# Patient Record
Sex: MALE | Race: WHITE
[De-identification: names, ages, dates, MRNs, and addresses within clinical notes are randomized per-mention and may not be internally consistent; named-entity substitution may affect disease eponyms.]

---

## 2018-11-24 ENCOUNTER — HOSPITAL ENCOUNTER (EMERGENCY)
Dept: HOSPITAL 97 - ER | Age: 73
Discharge: ANOTHER HOSPITAL | End: 2018-11-24
Payer: COMMERCIAL

## 2018-11-24 DIAGNOSIS — Z86.73: ICD-10-CM

## 2018-11-24 DIAGNOSIS — R09.2: Primary | ICD-10-CM

## 2018-11-24 DIAGNOSIS — Z79.82: ICD-10-CM

## 2018-11-24 DIAGNOSIS — I50.9: ICD-10-CM

## 2018-11-24 DIAGNOSIS — E03.9: ICD-10-CM

## 2018-11-24 DIAGNOSIS — I95.9: ICD-10-CM

## 2018-11-24 DIAGNOSIS — F03.90: ICD-10-CM

## 2018-11-24 DIAGNOSIS — G40.909: ICD-10-CM

## 2018-11-24 DIAGNOSIS — R00.1: ICD-10-CM

## 2018-11-24 DIAGNOSIS — I10: ICD-10-CM

## 2018-11-24 LAB
ALBUMIN SERPL BCP-MCNC: 1.9 G/DL (ref 3.4–5)
ALP SERPL-CCNC: 135 U/L (ref 45–117)
ALT SERPL W P-5'-P-CCNC: 392 U/L (ref 12–78)
AST SERPL W P-5'-P-CCNC: 728 U/L (ref 15–37)
BUN BLD-MCNC: 19 MG/DL (ref 7–18)
CKMB CREATINE KINASE MB: 1.2 NG/ML (ref 0.3–3.6)
COHGB MFR BLDA: 1.1 % (ref 0–1.5)
COHGB MFR BLDA: 1.4 % (ref 0–1.5)
GLUCOSE SERPLBLD-MCNC: 103 MG/DL (ref 74–106)
HCT VFR BLD CALC: 35.2 % (ref 39.6–49)
INR BLD: 1.79
LIPASE SERPL-CCNC: 40 U/L (ref 73–393)
LYMPHOCYTES # SPEC AUTO: 4 K/UL (ref 0.7–4.9)
MCH RBC QN AUTO: 28.1 PG (ref 27–35)
MCV RBC: 91.4 FL (ref 80–100)
MORPHOLOGY BLD-IMP: (no result)
OXYHGB MFR BLDA: 85.5 % (ref 94–97)
OXYHGB MFR BLDA: 96.7 % (ref 94–97)
PMV BLD: 9.8 FL (ref 7.6–11.3)
POTASSIUM SERPL-SCNC: 4.6 MMOL/L (ref 3.5–5.1)
RBC # BLD: 3.86 M/UL (ref 4.33–5.43)
SAO2 % BLDA: 87.4 % (ref 92–98.5)
SAO2 % BLDA: 98.7 % (ref 92–98.5)
TROPONIN (EMERG DEPT USE ONLY): 0.03 NG/ML (ref 0–0.04)
UA COMPLETE W REFLEX CULTURE PNL UR: (no result)
VALPROATE SERPL-MCNC: 12 UG/ML (ref 50–100)

## 2018-11-24 PROCEDURE — 81015 MICROSCOPIC EXAM OF URINE: CPT

## 2018-11-24 PROCEDURE — 82962 GLUCOSE BLOOD TEST: CPT

## 2018-11-24 PROCEDURE — 85610 PROTHROMBIN TIME: CPT

## 2018-11-24 PROCEDURE — 87205 SMEAR GRAM STAIN: CPT

## 2018-11-24 PROCEDURE — 80048 BASIC METABOLIC PNL TOTAL CA: CPT

## 2018-11-24 PROCEDURE — 80164 ASSAY DIPROPYLACETIC ACD TOT: CPT

## 2018-11-24 PROCEDURE — 87086 URINE CULTURE/COLONY COUNT: CPT

## 2018-11-24 PROCEDURE — 80076 HEPATIC FUNCTION PANEL: CPT

## 2018-11-24 PROCEDURE — 82553 CREATINE MB FRACTION: CPT

## 2018-11-24 PROCEDURE — 87186 SC STD MICRODIL/AGAR DIL: CPT

## 2018-11-24 PROCEDURE — 85025 COMPLETE CBC W/AUTO DIFF WBC: CPT

## 2018-11-24 PROCEDURE — 94002 VENT MGMT INPAT INIT DAY: CPT

## 2018-11-24 PROCEDURE — 93005 ELECTROCARDIOGRAM TRACING: CPT

## 2018-11-24 PROCEDURE — 82550 ASSAY OF CK (CPK): CPT

## 2018-11-24 PROCEDURE — 83690 ASSAY OF LIPASE: CPT

## 2018-11-24 PROCEDURE — 82805 BLOOD GASES W/O2 SATURATION: CPT

## 2018-11-24 PROCEDURE — 87040 BLOOD CULTURE FOR BACTERIA: CPT

## 2018-11-24 PROCEDURE — 84145 PROCALCITONIN (PCT): CPT

## 2018-11-24 PROCEDURE — 99291 CRITICAL CARE FIRST HOUR: CPT

## 2018-11-24 PROCEDURE — 36415 COLL VENOUS BLD VENIPUNCTURE: CPT

## 2018-11-24 PROCEDURE — 70450 CT HEAD/BRAIN W/O DYE: CPT

## 2018-11-24 PROCEDURE — 85730 THROMBOPLASTIN TIME PARTIAL: CPT

## 2018-11-24 PROCEDURE — 74177 CT ABD & PELVIS W/CONTRAST: CPT

## 2018-11-24 PROCEDURE — 99292 CRITICAL CARE ADDL 30 MIN: CPT

## 2018-11-24 PROCEDURE — 92950 HEART/LUNG RESUSCITATION CPR: CPT

## 2018-11-24 PROCEDURE — 87088 URINE BACTERIA CULTURE: CPT

## 2018-11-24 PROCEDURE — 71045 X-RAY EXAM CHEST 1 VIEW: CPT

## 2018-11-24 PROCEDURE — 87077 CULTURE AEROBIC IDENTIFY: CPT

## 2018-11-24 PROCEDURE — 84484 ASSAY OF TROPONIN QUANT: CPT

## 2018-11-24 PROCEDURE — 81003 URINALYSIS AUTO W/O SCOPE: CPT

## 2018-11-24 PROCEDURE — 83605 ASSAY OF LACTIC ACID: CPT

## 2018-11-24 NOTE — ER
Nurse's Notes                                                                                     

 [facility]                                                                

Name: Kenn Schulte                                                                                 

Age: 73 yrs                                                                                       

Sex: Male                                                                                         

: 1945                                                                                   

MRN: [id number]                                                                                   

Arrival Date: 2018                                                                          

Time: 09:37                                                                                       

Account#: [id number]                                                                            

Bed 3                                                                                             

Private MD:                                                                                       

Diagnosis: CPR - successful, Hypotension, bradycardia, respiratory arrest                         

                                                                                                  

Presentation:                                                                                     

                                                                                             

09:28 Presenting complaint: EMS states: toned out for pt "crashing", CPR in progress at 0857  iw  

      from [facility], arrived to scene, pt was asystole on monitor, CPR in progress,      

      ACLS initiated by EMS, 4 rounds of CPR, 3 rounds of EPI and one bicarb given, ROSC          

      achieved approx 8 min PTA. Care prior to arrival: Assisted ventilation, Oral                

      intubation, CPR via thumper performed by bystander performed by EMS Medication(s)           

      given: Epi X 3, Bicarb X 1. Compressions began prior to arrival.                            

09:28 Method Of Arrival: EMS: [facility]                                                       iw  

09:28 Acuity: CECILE 1                                                                           iw  

09:28 Transition of care: patient was received from another setting of care (long-term care     

      facility), [place]. Onset of symptoms was 2018. Risk Assessment: Do you      

      want to hurt yourself or someone else? Unable to obtain. Initial Sepsis Screen: Does        

      the patient meet any 2 criteria? No. Patient's initial sepsis screen is negative. Does      

      the patient have a suspected source of infection?.                                          

09:28 Care prior to arrival: IV initiated. 20 GA, in the left thigh Glucose check: 122 Oxygen iw  

      administered. via AMBU bag.                                                                 

09:28 Care prior to arrival: Medication(s) given: Normal saline infusion, 500 mL.             iw  

                                                                                                  

Historical:                                                                                       

- Allergies:                                                                                      

11:47 No Known Allergies;                                                                     sv  

- Home Meds:                                                                                      

12:17 Melatonin Oral [Active]; Depakote Oral [Active]; Lactulose Oral [Active]; Levaquin Oral sv  

      [Active]; levothyroxine oral [Active]; Aspirin Oral [Active]; Nystatin Oral [Active];       

      Norco Oral [Active]; Colace oral oral [Active]; Ativan Oral [Active];                       

- PMHx:                                                                                           

11:47 Seizures; CVA; TIA; CAD; Hypertension; CHF; DVT; advanced dementia; aspiration pnemonia;sv  

12:17 Hypothyroidism;                                                                         sv  

12:32 right and left leg DVT; dyslipidemia;                                                   sv  

- PSHx:                                                                                           

11:47 IVC filter;                                                                             sv  

                                                                                                  

- Immunization history:: Adult Immunizations unknown.                                             

- Social history:: Smoking status: unknown.                                                       

- Ebola Screening: : Patient negative for fever greater than or equal to 101.5 degrees            

  Fahrenheit, and additional compatible Ebola Virus Disease symptoms Patient denies               

  exposure to infectious person Patient denies travel to an Ebola-affected area in the            

  21 days before illness onset No symptoms or risks identified at this time.                      

- Code Status:: Full code.                                                                        

                                                                                                  

                                                                                                  

Screenin:57 Abuse screen: Denies threats or abuse. Nutritional screening: No deficits noted.        tw2 

      Tuberculosis screening: No symptoms or risk factors identified. Fall Risk Secondary         

      diagnosis (15 points) impaired mobility.                                                    

                                                                                                  

Assessment:                                                                                       

09:28 General: Appears ill, unkempt, emaciated, Behavior is unresponsive. Pain: Unable to use iw  

      pain scale. Patient is intubated. Patient is unresponsive. Neuro: Level of                  

      Consciousness is unresponsive. Cardiovascular: Rhythm is sinus bradycardia.                 

      Respiratory: Airway via oral intubation Trachea midline Respiratory pattern is regular.     

      GI: Abdomen is flat, Enteral feeding tube clamped. : Sanchez in place to gravity            

      drainage Urine is cloudy, Penile discharge is white. Derm: Skin is fragile, is thin,        

      with poor turgor.                                                                           

09:32 Reassessment: strong femoral pulse palpated to right groin, HR=44, BP=47/31, ETT        iw  

      suctioned byRT.                                                                             

09:35 Reassessment: ETT moved from 26 cm at gum to 24 cm at gum, per Dr. Gibson, maintain    iw  

      7.5 ETT by EMS.                                                                             

09:45 Reassessment: Dopamine started at 20 mcg/kg/min.                                        sv  

09:47 Reassessment: Dr. Gibson at bedside to set up for central line placement, RT bagging   iw  

      pt, ZT=822, 100% SpO2.                                                                      

10:00 Reassessment: verbal order to change dopamine infusion to Evans-Synephrine infusion for   iw  

      HR of 130.                                                                                  

10:30 General: Appears ill, emaciated, Behavior is unresponsive. intubated. Pain: Unable to   sv  

      use pain scale. Patient is intubated. Patient is unresponsive. Cardiovascular:              

      Patient's skin is warm and dry. Pulses are 2+ in right radial artery and left radial        

      artery Rhythm is sinus rhythm. Respiratory: Airway via oral intubation Trachea midline      

      Respiratory effort is even, unlabored, Respiratory pattern is regular, symmetrical,         

      Breath sounds with rhonchi bilaterally. : Sanchez in place to gravity drainage. Derm:       

      Skin is fragile, is thin, with poor turgor.                                                 

11:30 Reassessment: Patient appears in no apparent distress at this time. No changes from     sv  

      previously documented assessment. Pt remains intubated.                                     

12:23 Reassessment: Patient appears in no apparent distress at this time. No changes from     sv  

      previously documented assessment. Pt remains intubated.                                     

12:38 Reassessment: CRITICAL LAB ALERT: LACTATE 4.7, provider notified and notified           stan Alicia RN.                                                                               

13:17 Reassessment: Dr Gibson speaking with the granddaughter.                               sv  

13:17 Reassessment: Patient appears in no apparent distress at this time. No changes from     sv  

      previously documented assessment. Pt remains intubated.                                     

13:45 Reassessment: While in CT, pt received the IV contrast and pt was asystole x2 but pulse sv  

      came back on its own with no CPR. Came back to ER#3 and informed Dr Gibson.                

13:58 Reassessment: Pt back in ER #3. Pt has had 2 more episodes of going asystole and then   sv  

      his pulse came back on its own with Dr Gibson at bedside.                                  

14:57 Reassessment: Patient appears in no apparent distress at this time. No changes from     sv  

      previously documented assessment. Pt remains intubated.                                     

                                                                                                  

Vital Signs:                                                                                      

09:36  / 75; Pulse 86; Resp 29 A; Pulse Ox 100% on ETT ambu;                            iw  

09:45 Weight 63.5 kg (R);                                                                     tw2 

09:45 BP 68 / 45; Pulse 126; Resp 16; Temp 100.7(R); Pulse Ox 100% on ETT ambu;               iw  

09:49 BP 74 / 44; Pulse 122; Resp 18 A; Pulse Ox 100% on ETT ambu;                            iw  

10:08 BP 81 / 64; Pulse 110; Resp 20 A; Pulse Ox 100% on ETT ambu;                            iw  

10:13 BP 80 / 52; Pulse 103; Resp 14 A; Pulse Ox 100% on ETT ambu;                            iw  

10:35 BP 56 / 41; Pulse 95; Resp 23; Pulse Ox 92% on 60% FiO2 ETT vent;                       sv  

10:36 BP 59 / 41; Pulse 93; Resp 22; Pulse Ox 91% on 60% FiO2 ETT vent;                       sv  

10:40 BP 65 / 39; Pulse 92; Resp 22; Pulse Ox 93% on 60% FiO2 ETT vent;                       sv  

10:43 BP 62 / 43; Pulse 90; Resp 24; Pulse Ox 93% on 60% FiO2 ETT vent;                       sv  

10:48 BP 66 / 45; Pulse 88; Resp 22; Pulse Ox 95% on 60% FiO2 ETT vent;                       sv  

10:50 BP 69 / 48; Pulse 87; Resp 25; Pulse Ox 96% on 60% FiO2 ETT vent;                       sv  

10:55 BP 77 / 48; Pulse 84; Resp 16; Pulse Ox 98% on 60% FiO2 ETT vent;                       sv  

10:59 BP 88 / 50; Pulse 82; Resp 11; Pulse Ox 98% on 60% FiO2 ETT vent;                       sv  

11:00 BP 91 / 55; Pulse 79; Resp 16; Pulse Ox 98% on 60% FiO2 ETT vent;                       sv  

11:05 BP 93 / 59; Pulse 82; Resp 17; Pulse Ox 97% on 60% FiO2 ETT vent;                       sv  

11:10  / 60; Pulse 80; Resp 18; Pulse Ox 98% on 60% FiO2 ETT vent;                      sv  

11:15 BP 90 / 51; Pulse 83; Resp 11; Temp 97.5(R); Pulse Ox 97% on 60% FiO2 ETT vent;         sv  

11:27  / 56; Pulse 82; Resp 16; Pulse Ox 96% on 60% FiO2 ETT vent;                      sv  

11:45  / 62; Pulse 82; Resp 16; Pulse Ox 97% on 60% FiO2 ETT vent;                      sv  

11:55  / 66; Pulse 82; Resp 19; Pulse Ox 99% on 60% FiO2 ETT vent;                      sv  

12:20  / 60; Pulse 79; Resp 19; Pulse Ox 98% on 60% FiO2 ETT vent;                      sv  

12:50  / 59; Pulse 69; Resp 21; Pulse Ox 98% on 60% FiO2 ETT vent;                      sv  

13:00 BP 80 / 57; Pulse 90; Resp 28; Pulse Ox 99% on 60% FiO2 ETT vent;                       sv  

13:10 BP 97 / 52; Pulse 74; Resp 21; Pulse Ox 99% on 60% FiO2 ETT vent;                       sv  

13:48 BP 95 / 47; Pulse 78; Resp 16; Pulse Ox 100% on 60% FiO2 ETT vent;                      sv  

14:05  / 71; Pulse 64; Resp 16; Temp 96.9(R); Pulse Ox 100% on 60% FiO2 ETT vent;       sv  

14:15  / 74; Pulse 92; Resp 12; Pulse Ox 100% on 60% FiO2 ETT vent;                     sv  

14:30  / 70; Pulse 97; Resp 25; Pulse Ox 100% on 60% FiO2 ETT vent;                     sv  

14:45  / 76; Pulse 99; Resp 16; Pulse Ox 100% on 60% FiO2 ETT vent;                     sv  

15:00  / 73; Pulse 88 MON; Resp 18; Pulse Ox 100% on 60% FiO2 ETT vent;                 sv  

15:15  / 83; Pulse 88; Resp 24; Pulse Ox 100% on 60% FiO2 ETT vent;                     sv  

15:35  / 74; Pulse 95; Resp 16; Pulse Ox 100% on 60% FiO2 ETT vent;                     sv  

15:00 Sinus Rhythm                                                                            sv  

                                                                                                  

ED Course:                                                                                        

09:28 Maintain EMS IV. Dressing intact. Good blood return noted. Site clean \T\ dry. Gauge \T\    sv

      site: 20 left thigh.                                                                        

09:29 Placed in gown. Bed in low position. Side rails up X2. Cardiac monitor on. Pulse ox on. tw2 

      NIBP on.                                                                                    

09:37 Patient arrived in ED.                                                                  iw  

09:39 Jakub Gibson MD is Attending Physician.                                              eb  

09:43 Triage completed.                                                                       iw  

09:43 Maxine Mittal, RN is Primary Nurse.                                                        tw2 

09:44 Arm band placed on.                                                                     iw  

09:50 Assisted provider with central line placement. Set up central line tray. Triple lumen   iw  

      line placed in right femoral. Line placed by Jakub Gibson MD Placement verified by         

      blood return, Dressed with Tape, Tegaderm, Blood was collected. Patient tolerated well.     

      Before procedure, did Practitioner(s) obtain informed consent? No. Time-out/Briefing        

      performed prior to start of procedure? Yes. Was handwashing/sanitizing done immediately     

      prior to procedure? Yes. Was procedure site sterilized? Yes, with chlorhexidine. Was        

      the site allowed to dry? Yes. During the procedure, did the Practitioner(s) maintain a      

      sterile field? Yes. Were unused ports clamped during insertion? Yes. Was a 2nd              

      qualified MD obtained after 3 unsuccessful insertion attempts? No. Was blood aspirated      

      from each lumen? Yes. After the procedure, did the Practitioner(s) clean the site and       

      apply a sterile dressing? Yes.                                                              

10:16 Primary Nurse role handed off by Maxine Mittal RN                                         eb  

10:16 Maral Guzman, RN is Primary Nurse.                                                  eb  

11:17 Chest Single View XRAY In Process Unspecified.                                          EDMS

11:57 LAB Add On Sent.                                                                        sv  

11:59 Basic Metabolic Panel Sent.                                                             sv  

12:26 One-on-one care X 120 minutes.                                                          sv  

12:27 transfer approval from receiving facility.                                              sv  

13:03 Radiology exam delayed due to WAITING ON RESPIRATORY FOR TRANSPORT TO CT.               vm2 

13:16 Patient moved to CT via stretcher.                                                      vm2 

13:42 CT completed. Patient moved back from CT.                                               cw1 

13:48 CT Head Brain wo Cont In Process Unspecified.                                           EDMS

13:48 CT Abd/Pelvis - W/Contrast In Process Unspecified.                                      EDMS

14:08 \T\1254 initiated a transfer with [facility] at the [facility]\T\1313 administrative approval  eb
  

      given by Trinidad Martino accepted the patient in transfer/ patient going to [address] [place] [address] bed 15, report to be called [phone].                                           

14:57 Patient transferred, IV remains in place. intact.                                       sv  

                                                                                                  

Administered Medications:                                                                         

      Discontinued: Evans-Synephrine 100 mcg/min IV at calculated rate continuous; (Standard        

      dilution is 50 mg in 250 mL D5W, final concentration 200 mcg/mL)                            

      Discontinued: Dopamine drip 5 mcg/kg/min - (DOPamine 400 mg, D5W 250 ml) IV at              

      calculated rate continuous; Titrate to keep systolic blood pressure greater than 90mmHg     

09:34 Drug: EPINEPHrine 0.1mg/mL 1:10,000 1 mg {Note: left thigh.} Route: IVP; Site: Other;   iw  

10:00 Follow up: Response: No adverse reaction                                                sv  

09:35 Drug: NS 0.9% (30 ml/kg) 30 ml/kg {Note: left upper thigh.} Route: IV; Rate: bolus;     sv  

      Site: Other;                                                                                

11:00 Follow up: Response: No adverse reaction; IV Status: Completed infusion; IV Intake:     sv  

      2000ml                                                                                      

09:35 Drug: Sodium Bicarbonate 1 amp {Note: left thigh.} Route: IVP; Site: Other;             iw  

10:00 Follow up: Response: No adverse reaction                                                sv  

09:50 Drug: Dopamine drip 5 mcg/kg/min - (DOPamine 400 mg, D5W 250 ml) {Note: staretd at 20   iw  

      mcg/kg/min.} Route: IV; Rate: calculated rate; Site: right antecubital;                     

10:02 Drug: Evans-Synephrine 100 mcg/min {Note: started at 5 mcg/min, per Dr. Gibson.} Route:  iw  

      IV; Rate: calculated rate; Site: right femoral;                                             

10:19 Follow up: Rate change 10 calculated rate                                               sv  

10:26 Follow up: Rate change 40 calculated rate                                               sv  

10:35 Drug: Norepinephrine (4 mg/250 mL D5W) 4 mcg/min {Note: started at 5 mcg/min.} Route:   sv  

      IV; Rate: calculated rate; Site: right femoral;                                             

10:38 Follow up: Rate change 6 mcg/min                                                        sv  

10:41 Follow up: Rate change 8 mcg/min                                                        sv  

10:43 Follow up: Rate change 9 mcg/min                                                        sv  

10:46 Follow up: Rate change 11 mcg/min                                                       sv  

10:49 Follow up: Rate change 20 mcg/min                                                       sv  

10:54 Follow up: Rate change 25 mcg/min                                                       sv  

13:58 Follow up: Rate change 30 mcg/min                                                       sv  

15:34 Follow up: Rate change 20 calculated rate                                               sv  

16:10 Follow up: Response: No adverse reaction; IV Status: Infusion continued upon transfer   sv  

11:00 Drug: Rocephin - (cefTRIAXone) 1 grams Route: IVPB; Infused Over: 30 mins; Site: right  sv  

      femoral;                                                                                    

11:02 Follow up: Response: No adverse reaction; IV Status: Completed infusion; IV Intake: 10mlsv  

11:02 Drug: vancoMYCIN 1 grams Route: IVPB; Infused Over: 2 hrs; Site: right femoral;         sv  

13:00 Follow up: Response: No adverse reaction; IV Status: Completed infusion; IV Intake:     sv  

      250ml                                                                                       

11:41 Drug: Ativan 1 mg Route: IVP; Site: right femoral;                                      sv  

11:56 Follow up: Response: No adverse reaction                                                sv  

13:25 Drug: Depakote 20 mg/kg Route: IVPB; Site: right femoral;                               sv  

14:21 Follow up: Response: No adverse reaction; IV Status: Completed infusion                 tw2 

14:25 Follow up: Response: No adverse reaction; IV Status: Completed infusion; IV Intake:     sv  

      100ml                                                                                       

13:36 Drug: Ativan 2 mg Route: IVP; Site: right femoral;                                      sv  

14:00 Follow up: Response: No adverse reaction                                                sv  

13:58 Drug: Dopamine drip 5 mcg/kg/min - (DOPamine 400 mg, D5W 250 ml) Route: IV; Rate:       sv  

      calculated rate; Site: right femoral;                                                       

16:10 Follow up: Response: No adverse reaction; IV Status: Infusion continued upon transfer   sv  

                                                                                                  

                                                                                                  

Point of Care Testing:                                                                            

      Blood Glucose:                                                                              

09:30 Blood Glucose: 112 mg/dL;                                                               iw  

      Ranges:                                                                                     

                                                                                                  

Intake:                                                                                           

11:00 IV: 2000ml; Total: 2000ml.                                                              sv  

11:02 IV: 10ml; Total: 2010ml.                                                                sv  

13:00 IV: 250ml; Total: 2260ml.                                                               sv  

14:25 IV: 100ml; Total: 2360ml.                                                               sv  

                                                                                                  

Ventilator:                                                                                       

11:29 Fi02: 60%; Rate: 16min; T.V.: 51ml; Peep: 0cm; Mode: CMV; ET tube: 7.0 mm (Oral);       sv  

                                                                                                  

Outcome:                                                                                          

14:56 Transferred by ground EMS to [facility], Transfer form completed.    sv  

      X-rays sent w/ patient. Note:  Report given to Carlee COOL at [facility].     

14:56 Condition: stable                                                                           

14:56 Instructed on the need for transfer.                                                        

15:00 ER care complete, transfer ordered by MD.                                               kdr 

16:10 Patient left the ED.                                                                    sv  

                                                                                                  

Signatures:                                                                                       

Dispatcher [employer]                           EDMaral Fernando RN RN   sv                                                   

Jakub Gibson MD MD kdr Williams, Irene RN                     RN   Ignacia Huynh                             cwWilmer James RN                         RN   [id number]                                                  

Maxine Mittal RN                          RN   [email or web address]                                                  

Rosmery Rodriguez Victoria vm2 Botello, Elizabeth eb                                                   

                                                                                                  

Corrections: (The following items were deleted from the chart)                                    

09:48 09:47 CPR assessment: iw                                                                iw  

10:05 10:00 Maintain EMS IV. Dressing intact. Good blood return noted. Site clean \T\ dry.      iw

      Gauge \T\ site: 20 g Right femoral. la1                                                     

11:27 11:18  / 60; Pulse 89bpm; Resp 14bpm; Pulse Ox 97% ET / Ambu; mh5                 sv  

11:28 11:15 BP 90 / 51; Pulse 83bpm; Resp 11bpm; Pulse Ox 97% FiO2 60% vent; sv               sv  

11:30 10:05 Maintain EMS IV. Dressing intact. Good blood return noted. Site clean \T\ dry.      sv

      Gauge \T\ site: 20 left thigh. iw                                                           

11:55 10:35 Norepinephrine (4 mg/250 mL D5W) 4 mcg/min IV at calculated rate in right femoral sv  

      sv                                                                                          

18:02 14:50 Response: No adverse reaction; IV Status: Infusion continued upon transfer sv     sv  

                                                                                                  

**************************************************************************************************

## 2018-11-24 NOTE — RAD REPORT
EXAM DESCRIPTION:  RAD - Chest Single View - 11/24/2018 11:17 am

 

CLINICAL HISTORY:  Intubation, CPR in progress

 

COMPARISON:  None.

 

TECHNIQUE:  AP portable chest image was obtained 1 1 0 3 hours .

 

FINDINGS:  Endotracheal tube is in place with the tip 1 centimeter above the dariana. Resuscitation pa
ddles are in place. No pneumothorax or tracheal shift. Interstitial and alveolar opacities are presen
t in the right lung field. Small right pleural effusion is suspected. Heart and vasculature are oscar
l. Prominent upper abdominal bowel gas pattern likely from resuscitation efforts. No acute aortic fin
dings suspected.

 

IMPRESSION:  Endotracheal tube in good position.

 

Scattered interstitial and alveolar opacities in the right lung field with probable small right pleur
al effusion.

## 2018-11-24 NOTE — EDPHYS
Physician Documentation                                                                           

 [facility]                                                                

Name: Kenn Schulte                                                                                 

Age: 73 yrs                                                                                       

Sex: Male                                                                                         

: 1945                                                                                   

MRN: [id number]                                                                                   

Arrival Date: 2018                                                                          

Time: 09:37                                                                                       

Account#: [id number]                                                                            

Bed 3                                                                                             

Private MD:                                                                                       

ED Physician Jakub Gibson                                                                       

HPI:                                                                                              

                                                                                             

12:28 This 73 yrs old  Male presents to ER via EMS with complaints of CPR - ROSC.    kdr 

12:28 Preceding the arrest, the patient was found down. The arrest occurred at nursing home.  kdr 

      Pre-hospital course: EMS care prior to arrival: initiation of ACLS, peripheral IV,          

      intubation oxygen, backboard, Time elapsed prior to ACLS is unknown. ACLS has been in       

      progress for 30 minutes. It is unknown whether or not the patient has had similar           

      symptoms in the past. It is unknown whether or not the patient has recently seen a          

      physician. EMS reports that the patient was found down for unknown length of time.          

      Initial rhythm was asystole. Several rounds of epi given with CPR and the patient had       

      SROC. On initial eval in the ED, the patient had a HR in the 80's and hypotensive.          

                                                                                                  

Historical:                                                                                       

- Allergies:                                                                                      

11:47 No Known Allergies;                                                                     sv  

- Home Meds:                                                                                      

12:17 Melatonin Oral [Active]; Depakote Oral [Active]; Lactulose Oral [Active]; Levaquin Oral sv  

      [Active]; levothyroxine oral [Active]; Aspirin Oral [Active]; Nystatin Oral [Active];       

      Norco Oral [Active]; Colace oral oral [Active]; Ativan Oral [Active];                       

- PMHx:                                                                                           

11:47 Seizures; CVA; TIA; CAD; Hypertension; CHF; DVT; advanced dementia; aspiration pnemonia;sv  

12:17 Hypothyroidism;                                                                         sv  

12:32 right and left leg DVT; dyslipidemia;                                                   sv  

- PSHx:                                                                                           

11:47 IVC filter;                                                                             sv  

                                                                                                  

- Immunization history:: Adult Immunizations unknown.                                             

- Social history:: Smoking status: unknown.                                                       

- Ebola Screening: : Patient negative for fever greater than or equal to 101.5 degrees            

  Fahrenheit, and additional compatible Ebola Virus Disease symptoms Patient denies               

  exposure to infectious person Patient denies travel to an Ebola-affected area in the            

  21 days before illness onset No symptoms or risks identified at this time.                      

- Code Status:: Full code.                                                                        

                                                                                                  

                                                                                                  

ROS:                                                                                              

12:54 Constitutional: Unable to determine as the patient is intubated and unresponsive        kdr 

12:54 Unable to obtain ROS due to altered mental status, patient is on ventilator.                

                                                                                                  

Exam:                                                                                             

12:54 Constitutional:  This is a well developed, well nourished but cachetic/emaciated        kdr 

      patient who is awake, alert, and in no acute distress. Head/Face:  Normocephalic,           

      atraumatic. Neck:  Trachea midline, no thyromegaly or masses palpated, and no cervical      

      lymphadenopathy.  Supple, full range of motion without nuchal rigidity, or vertebral        

      point tenderness.  No Meningismus. Cardiovascular:  Regular rate and zac rhythm with      

      a normal S1 and S2.  No gallops, murmurs, or rubs.  Normal PMI, no JVD.  Good pulse         

      after epi but slowly begins to discipate                                                    

12:54 Chest/axilla: Barrel chested.  Initially the BS were decreased on the left - withdrew       

      ET tube 2 cm and breath sounds now improved.                                                

                                                                                                  

Vital Signs:                                                                                      

09:36  / 75; Pulse 86; Resp 29 A; Pulse Ox 100% on ETT ambu;                            iw  

09:45 Weight 63.5 kg (R);                                                                     tw2 

09:45 BP 68 / 45; Pulse 126; Resp 16; Temp 100.7(R); Pulse Ox 100% on ETT ambu;               iw  

09:49 BP 74 / 44; Pulse 122; Resp 18 A; Pulse Ox 100% on ETT ambu;                            iw  

10:08 BP 81 / 64; Pulse 110; Resp 20 A; Pulse Ox 100% on ETT ambu;                            iw  

10:13 BP 80 / 52; Pulse 103; Resp 14 A; Pulse Ox 100% on ETT ambu;                            iw  

10:35 BP 56 / 41; Pulse 95; Resp 23; Pulse Ox 92% on 60% FiO2 ETT vent;                       sv  

10:36 BP 59 / 41; Pulse 93; Resp 22; Pulse Ox 91% on 60% FiO2 ETT vent;                       sv  

10:40 BP 65 / 39; Pulse 92; Resp 22; Pulse Ox 93% on 60% FiO2 ETT vent;                       sv  

10:43 BP 62 / 43; Pulse 90; Resp 24; Pulse Ox 93% on 60% FiO2 ETT vent;                       sv  

10:48 BP 66 / 45; Pulse 88; Resp 22; Pulse Ox 95% on 60% FiO2 ETT vent;                       sv  

10:50 BP 69 / 48; Pulse 87; Resp 25; Pulse Ox 96% on 60% FiO2 ETT vent;                       sv  

10:55 BP 77 / 48; Pulse 84; Resp 16; Pulse Ox 98% on 60% FiO2 ETT vent;                       sv  

10:59 BP 88 / 50; Pulse 82; Resp 11; Pulse Ox 98% on 60% FiO2 ETT vent;                       sv  

11:00 BP 91 / 55; Pulse 79; Resp 16; Pulse Ox 98% on 60% FiO2 ETT vent;                       sv  

11:05 BP 93 / 59; Pulse 82; Resp 17; Pulse Ox 97% on 60% FiO2 ETT vent;                       sv  

11:10  / 60; Pulse 80; Resp 18; Pulse Ox 98% on 60% FiO2 ETT vent;                      sv  

11:15 BP 90 / 51; Pulse 83; Resp 11; Temp 97.5(R); Pulse Ox 97% on 60% FiO2 ETT vent;         sv  

11:27  / 56; Pulse 82; Resp 16; Pulse Ox 96% on 60% FiO2 ETT vent;                      sv  

11:45  / 62; Pulse 82; Resp 16; Pulse Ox 97% on 60% FiO2 ETT vent;                      sv  

11:55  / 66; Pulse 82; Resp 19; Pulse Ox 99% on 60% FiO2 ETT vent;                      sv  

12:20  / 60; Pulse 79; Resp 19; Pulse Ox 98% on 60% FiO2 ETT vent;                      sv  

12:50  / 59; Pulse 69; Resp 21; Pulse Ox 98% on 60% FiO2 ETT vent;                      sv  

13:00 BP 80 / 57; Pulse 90; Resp 28; Pulse Ox 99% on 60% FiO2 ETT vent;                       sv  

13:10 BP 97 / 52; Pulse 74; Resp 21; Pulse Ox 99% on 60% FiO2 ETT vent;                       sv  

13:48 BP 95 / 47; Pulse 78; Resp 16; Pulse Ox 100% on 60% FiO2 ETT vent;                      sv  

14:05  / 71; Pulse 64; Resp 16; Temp 96.9(R); Pulse Ox 100% on 60% FiO2 ETT vent;       sv  

14:15  / 74; Pulse 92; Resp 12; Pulse Ox 100% on 60% FiO2 ETT vent;                     sv  

14:30  / 70; Pulse 97; Resp 25; Pulse Ox 100% on 60% FiO2 ETT vent;                     sv  

14:45  / 76; Pulse 99; Resp 16; Pulse Ox 100% on 60% FiO2 ETT vent;                     sv  

15:00  / 73; Pulse 88 MON; Resp 18; Pulse Ox 100% on 60% FiO2 ETT vent;                 sv  

15:15  / 83; Pulse 88; Resp 24; Pulse Ox 100% on 60% FiO2 ETT vent;                     sv  

15:35  / 74; Pulse 95; Resp 16; Pulse Ox 100% on 60% FiO2 ETT vent;                     sv  

15:00 Sinus Rhythm                                                                            sv  

                                                                                                  

Ventilator:                                                                                       

11:29 Fi02: 60%; Rate: 16min; T.V.: 51ml; Peep: 0cm; Mode: CMV; ET tube: 7.0 mm (Oral);       sv  

                                                                                                  

MDM:                                                                                              

15:00 Patient medically screened.                                                             kdr 

15:00 Data reviewed: vital signs, nurses notes, radiologic studies. Counseling: I had a       kdr 

      detailed discussion with the patient and/or guardian regarding: the historical points,      

      exam findings, and any diagnostic results supporting the discharge/admit diagnosis, lab     

      results, radiology results, the need to transfer to another facility.                       

                                                                                                  

                                                                                             

09:38 Order name: ABG; Complete Time: 12:45                                                   eb  

                                                                                             

09:44 Order name: Basic Metabolic Panel                                                       tw2 

                                                                                             

09:44 Order name: Blood Culture Adult (2)                                                     tw2 

                                                                                             

09:44 Order name: CBC with Diff; Complete Time: 12:45                                         tw2 

                                                                                             

09:44 Order name: Ckmb; Complete Time: 12:46                                                  tw2 

                                                                                             

09:44 Order name: CPK; Complete Time: 12:46                                                   tw2 

                                                                                             

09:44 Order name: Lactate; Complete Time: 12:45                                               tw2 

                                                                                             

09:44 Order name: LFT's; Complete Time: 12:46                                                 tw2 

                                                                                             

09:44 Order name: Lipase; Complete Time: 12:46                                                tw2 

                                                                                             

09:44 Order name: Procalcitonin; Complete Time: 12:46                                         tw2 

                                                                                             

09:44 Order name: Protime (+inr); Complete Time: 12:46                                        tw2 

                                                                                             

09:44 Order name: Ptt, Activated; Complete Time: 12:46                                        tw2 

                                                                                             

09:44 Order name: Troponin (emerg Dept Use Only); Complete Time: 12:46                        tw2 

                                                                                             

09:44 Order name: Urine Microscopic Only; Complete Time: 12:46                                tw2 

                                                                                             

09:44 Order name: Chest Single View XRAY; Complete Time: 12:46                                tw2 

                                                                                             

09:45 Order name: glucometer results - FOR PT WITH NO ID; Complete Time: 14:12                tw2 

                                                                                             

09:45 Order name: Basic Metabolic Panel; Complete Time: 12:46                                 EDMS

                                                                                             

10:29 Order name: Manual Differential; Complete Time: 12:46                                   EDMS

                                                                                             

11:39 Order name: Valproic Acid (Depakene) Level; Complete Time: 12:46                        EDMS

                                                                                             

11:49 Order name: LAB Add On                                                                  sv  

                                                                                             

12:00 Order name: Lactate; Complete Time: 12:46                                               sv  

                                                                                             

12:27 Order name: Urine Culture                                                               EDMS

                                                                                             

12:45 Order name: CT Head Brain wo Cont; Complete Time: 14:12                                 kdr 

                                                                                             

12:53 Order name: ABG; Complete Time: 14:12                                                   kdr 

                                                                                             

12:53 Order name: CT Abd/Pelvis - W/Contrast; Complete Time: 14:24                            kdr 

                                                                                             

13:38 Order name: Urine Dipstick--Ancillary (enter results)                                   eb  

                                                                                             

09:44 Order name: Accucheck; Complete Time: 09:44                                             tw2 

                                                                                             

09:44 Order name: Cardiac monitoring; Complete Time: 11:58                                    tw2 

                                                                                             

09:44 Order name: EKG - Nurse/Tech; Complete Time: 11:58                                      tw2 

                                                                                             

09:44 Order name: IV Saline Lock - Large Bore; Complete Time: 11:58                           tw2 

                                                                                             

09:44 Order name: Labs collected and sent; Complete Time: 11:59                               tw2 

                                                                                             

09:44 Order name: O2 Per Protocol; Complete Time: 11:59                                       tw2 

                                                                                             

09:44 Order name: O2 Sat Monitoring; Complete Time: 11:59                                     tw2 

                                                                                             

09:44 Order name: Urine Dipstick-Ancillary (obtain specimen); Complete Time: 11:59            tw2 

                                                                                                  

Administered Medications:                                                                         

      Discontinued: Evans-Synephrine 100 mcg/min IV at calculated rate continuous; (Standard        

      dilution is 50 mg in 250 mL D5W, final concentration 200 mcg/mL)                            

      Discontinued: Dopamine drip 5 mcg/kg/min - (DOPamine 400 mg, D5W 250 ml) IV at              

      calculated rate continuous; Titrate to keep systolic blood pressure greater than 90mmHg     

09:34 Drug: EPINEPHrine 0.1mg/mL 1:10,000 1 mg {Note: left thigh.} Route: IVP; Site: Other;   iw  

10:00 Follow up: Response: No adverse reaction                                                sv  

09:35 Drug: NS 0.9% (30 ml/kg) 30 ml/kg {Note: left upper thigh.} Route: IV; Rate: bolus;     sv  

      Site: Other;                                                                                

11:00 Follow up: Response: No adverse reaction; IV Status: Completed infusion; IV Intake:     sv  

      2000ml                                                                                      

09:35 Drug: Sodium Bicarbonate 1 amp {Note: left thigh.} Route: IVP; Site: Other;             iw  

10:00 Follow up: Response: No adverse reaction                                                sv  

09:50 Drug: Dopamine drip 5 mcg/kg/min - (DOPamine 400 mg, D5W 250 ml) {Note: staretd at 20   iw  

      mcg/kg/min.} Route: IV; Rate: calculated rate; Site: right antecubital;                     

10:02 Drug: Evans-Synephrine 100 mcg/min {Note: started at 5 mcg/min, per Dr. Gibson.} Route:  iw  

      IV; Rate: calculated rate; Site: right femoral;                                             

10:19 Follow up: Rate change 10 calculated rate                                               sv  

10:26 Follow up: Rate change 40 calculated rate                                               sv  

10:35 Drug: Norepinephrine (4 mg/250 mL D5W) 4 mcg/min {Note: started at 5 mcg/min.} Route:   sv  

      IV; Rate: calculated rate; Site: right femoral;                                             

10:38 Follow up: Rate change 6 mcg/min                                                        sv  

10:41 Follow up: Rate change 8 mcg/min                                                        sv  

10:43 Follow up: Rate change 9 mcg/min                                                        sv  

10:46 Follow up: Rate change 11 mcg/min                                                       sv  

10:49 Follow up: Rate change 20 mcg/min                                                       sv  

10:54 Follow up: Rate change 25 mcg/min                                                       sv  

13:58 Follow up: Rate change 30 mcg/min                                                       sv  

15:34 Follow up: Rate change 20 calculated rate                                               sv  

16:10 Follow up: Response: No adverse reaction; IV Status: Infusion continued upon transfer   sv  

11:00 Drug: Rocephin - (cefTRIAXone) 1 grams Route: IVPB; Infused Over: 30 mins; Site: right  sv  

      femoral;                                                                                    

11:02 Follow up: Response: No adverse reaction; IV Status: Completed infusion; IV Intake: 10mlsv  

11:02 Drug: vancoMYCIN 1 grams Route: IVPB; Infused Over: 2 hrs; Site: right femoral;         sv  

13:00 Follow up: Response: No adverse reaction; IV Status: Completed infusion; IV Intake:     sv  

      250ml                                                                                       

11:41 Drug: Ativan 1 mg Route: IVP; Site: right femoral;                                      sv  

11:56 Follow up: Response: No adverse reaction                                                sv  

13:25 Drug: Depakote 20 mg/kg Route: IVPB; Site: right femoral;                               sv  

14:21 Follow up: Response: No adverse reaction; IV Status: Completed infusion                 tw2 

14:25 Follow up: Response: No adverse reaction; IV Status: Completed infusion; IV Intake:     sv  

      100ml                                                                                       

13:36 Drug: Ativan 2 mg Route: IVP; Site: right femoral;                                      sv  

14:00 Follow up: Response: No adverse reaction                                                sv  

13:58 Drug: Dopamine drip 5 mcg/kg/min - (DOPamine 400 mg, D5W 250 ml) Route: IV; Rate:       sv  

      calculated rate; Site: right femoral;                                                       

16:10 Follow up: Response: No adverse reaction; IV Status: Infusion continued upon transfer   sv  

                                                                                                  

                                                                                                  

Point of Care Testing:                                                                            

      Blood Glucose:                                                                              

09:30 Blood Glucose: 112 mg/dL;                                                               iw  

      Ranges:                                                                                     

      Critical Glucose Levels:Adult <50 mg/dl or >400 mg/dl  <40 mg/dl or >180 mg/dl       

Disposition:                                                                                      

18 15:00 Transfer ordered to [facility]. Diagnosis is CPR -             

  successful, Hypotension, bradycardia, respiratory arrest.                                       

- Reason for transfer: Higher level of care.                                                      

- Accepting physician is Dr. Alanis.                                                             

- Condition is Fair.                                                                              

- Problem is new.                                                                                 

- Symptoms have improved.                                                                         

                                                                                                  

                                                                                                  

                                                                                                  

Signatures:                                                                                       

Dispatcher [employer]                           Maral Shelby RN                    RN   Jakub Del Castillo MD MD kdr Williams, Irene, RN RN   iw                                                   

Wilmer Evans RN RN   [id number]                                                  

Maxine Mittal RN                          RN   [id number]                                                  

                                                                                                  

Corrections: (The following items were deleted from the chart)                                    

10:23 09:45 Arterial Blood Gas+[id number] ordered. EDMS                                       EDMS

16:10 15:00 2018 15:00 Transfer ordered to [facility]. Diagnosis is sv  

      CPR - successful, Hypotension, bradycardia, respiratory arrest. Reason for transfer:        

      Higher level of care. Accepting physician is Dr. Alanis. Condition is Fair. Problem is     

      new. Symptoms have improved. kdr                                                            

                                                                                                  

**************************************************************************************************

## 2018-11-24 NOTE — RAD REPORT
EXAM DESCRIPTION:  CT - Head Brain Wo Cont - 11/24/2018 1:47 pm

 

CLINICAL HISTORY:  Intubated, CPR, altered mental status, CVA

 

COMPARISON:  None.

 

TECHNIQUE:  Axial 5 mm thick images of the head were obtained without IV contrast.

 

All CT scans are performed using dose optimization technique as appropriate and may include automated
 exposure control or mA/KV adjustment according to patient size.

 

FINDINGS:  No intracranial hemorrhage, mass, edema or shift of mid-line structures. No acute cortical
 based infarction seen. No diffuse cerebral edema pattern is present. Patient has very advanced atrop
hy and chronic ischemic change. Ventricles are in proportion to the amount of volume loss. Arterial a
nd physiologic calcifications are present.

 

Mastoid air cells are clear. Minimal fluid in the right maxillary sinus.

 

No acute bony findings.

 

 

IMPRESSION:  Advanced atrophy and chronic ischemic change.

 

No mass, hemorrhage or cerebral edema.

## 2018-11-24 NOTE — RAD REPORT
EXAM DESCRIPTION:  CT - Abdomen   Pelvis W Contrast - 11/24/2018 1:47 pm

 

CLINICAL HISTORY:  Abdominal pain, CPR for asystole

 

COMPARISON:  None.

 

TECHNIQUE:  Axial 5 millimeter thick images of the abdomen and pelvis were obtained following contras
t. Single phase acquisition was performed due to unstable clinical status. No oral contrast.

 

All CT scans are performed using dose optimization technique as appropriate and may include automated
 exposure control or mA/KV adjustment according to patient size.

 

FINDINGS:  Large bilateral pleural effusions are present. Right pleural effusion is loculated. There 
is atelectasis of the left lower lobe. In the posterior left lower lobe there is more focal consolida
tion that may be a superimposed infiltrate. Right lower lobe atelectasis is present. There are additi
onal air bronchograms in airspace opacification. Bronchial opacification is present. Interstitial and
 alveolar opacities are present in the right 6 upper lobe. No pericardial effusion.

 

The liver, spleen, and pancreas show no suspicious findings. Cholecystectomy clips are present. No bi
liary tree dilatation.

 

Symmetric renal perfusion is seen though somewhat dampened. No hydronephrosis or mass. Urinary bladde
r is fully contracted around a Sanchez catheter. No adrenal abnormality.

 

Stomach is distended with air and a small amount of fluid. PEG tube is in place. Numerous clips are s
een near the GE junction. There is soft tissue fullness near the GE junction. Surgical procedure perf
ormed is unknown.

 

Moderately large stool volume throughout the colon. Rectal wall thickening is present suspected to be
 chronic. Fluid filled nondilated small bowel loops are present. No free air or pneumatosis.  No gabriel
ia, mass or bulky lymphadenopathy. The urinary bladder is without significant finding.

 

No suspicious bony findings.

 

Fluid retention evident in the subcutaneous fatty tissues.

 

IMPRESSION:  No bowel obstruction, free air or surgically emergent abdominal or pelvic finding.

 

Gas distention of the stomach which may be related in part to resuscitation efforts. PEG tube is well
 positioned.

 

Focal masslike density is present near the GE junction. This is difficult to fully assess due to aniyah
on and limited contrast. Clips are present in the region. Correlation with history may be helpful.

 

Bilateral lower lobe mixed atelectasis and pneumonia. This could be infectious or aspiration related.


 

Moderate bilateral pleural effusions partially imaged. Right pleural effusion is loculated.

## 2018-11-25 NOTE — EKG
Test Date:    2018-11-24               Test Time:    09:42:05

Technician:   NAHED                                    

                                                     

MEASUREMENT RESULTS:                                       

Intervals:                                           

Rate:         66                                     

DC:           168                                    

QRSD:         108                                    

QT:           460                                    

QTc:          482                                    

Axis:                                                

P:            27                                     

DC:           168                                    

QRS:          43                                     

T:            43                                     

                                                     

INTERPRETIVE STATEMENTS:                                       

                                                     

Normal sinus rhythm

Incomplete right bundle branch block

Prolonged QT

Abnormal ECG

No previous ECG available for comparison



Electronically Signed On 11-25-18 09:45:48 CST by Dipak Salgado